# Patient Record
Sex: MALE | Race: WHITE | NOT HISPANIC OR LATINO | Employment: OTHER | URBAN - METROPOLITAN AREA
[De-identification: names, ages, dates, MRNs, and addresses within clinical notes are randomized per-mention and may not be internally consistent; named-entity substitution may affect disease eponyms.]

---

## 2021-05-07 ENCOUNTER — TELEPHONE (OUTPATIENT)
Dept: CARDIOLOGY | Facility: CLINIC | Age: 86
End: 2021-05-07

## 2021-05-07 ENCOUNTER — HOSPITAL ENCOUNTER (OUTPATIENT)
Dept: CARDIOLOGY | Facility: HOSPITAL | Age: 86
Discharge: HOME | End: 2021-05-07
Admitting: INTERNAL MEDICINE

## 2021-05-07 ENCOUNTER — OFFICE VISIT (OUTPATIENT)
Dept: CARDIOLOGY | Facility: CLINIC | Age: 86
End: 2021-05-07

## 2021-05-07 VITALS
BODY MASS INDEX: 26.14 KG/M2 | TEMPERATURE: 96.5 F | OXYGEN SATURATION: 99 % | HEIGHT: 72 IN | WEIGHT: 193 LBS | DIASTOLIC BLOOD PRESSURE: 82 MMHG | HEART RATE: 65 BPM | SYSTOLIC BLOOD PRESSURE: 124 MMHG

## 2021-05-07 DIAGNOSIS — I48.21 PERMANENT ATRIAL FIBRILLATION (HCC): Primary | ICD-10-CM

## 2021-05-07 DIAGNOSIS — I10 ESSENTIAL HYPERTENSION: ICD-10-CM

## 2021-05-07 DIAGNOSIS — R01.1 HEART MURMUR: ICD-10-CM

## 2021-05-07 DIAGNOSIS — R06.02 SHORTNESS OF BREATH: ICD-10-CM

## 2021-05-07 DIAGNOSIS — I48.21 PERMANENT ATRIAL FIBRILLATION (HCC): ICD-10-CM

## 2021-05-07 PROCEDURE — 93306 TTE W/DOPPLER COMPLETE: CPT | Performed by: INTERNAL MEDICINE

## 2021-05-07 PROCEDURE — 99204 OFFICE O/P NEW MOD 45 MIN: CPT | Performed by: INTERNAL MEDICINE

## 2021-05-07 PROCEDURE — 93306 TTE W/DOPPLER COMPLETE: CPT

## 2021-05-07 PROCEDURE — 93000 ELECTROCARDIOGRAM COMPLETE: CPT | Performed by: INTERNAL MEDICINE

## 2021-05-07 RX ORDER — HYDRALAZINE HYDROCHLORIDE 25 MG/1
25 TABLET, FILM COATED ORAL 3 TIMES DAILY
COMMUNITY
Start: 2021-04-27

## 2021-05-07 RX ORDER — FUROSEMIDE 20 MG/1
20 TABLET ORAL DAILY
COMMUNITY
Start: 2021-03-08

## 2021-05-07 NOTE — PROGRESS NOTES
"      CARDIOLOGY    Carmen Pelayo MD    ENCOUNTER DATE:  05/07/2021    Mohan Levy / 89 y.o. / male        CHIEF COMPLAINT / REASON FOR OFFICE VISIT     Atrial Fibrillation (new patient)      HISTORY OF PRESENT ILLNESS       HPI    Mohan Levy is a 89 y.o. male     This is a gentleman who is new to see me today. Unfortunately I do not have any old records on him. He previously followed with Dr. Lucero at [facility] but wants to switch his care to me. He was recently in the hospital with pneumonia at [facility]. He apparently had pleural effusion and had quite a bit of fluid drained from one side of his lungs. The amount or size is not known. He apparently has permanent atrial fibrillation and has had a pacemaker since 2010 with a generator change in 10/2020. He denies any chest pain or palpitations. He has chronic lower extremity edema which does not seem to be worse. He is still short of breath and uses oxygen as needed but his family has a pulse ox and says he is never hypoxic that they have found.        REVIEW OF SYSTEMS     Review of Systems   Constitutional: Negative for chills, fever, weight gain and weight loss.   Cardiovascular: Positive for leg swelling.   Respiratory: Positive for snoring. Negative for cough and wheezing.    Hematologic/Lymphatic: Negative for bleeding problem. Does not bruise/bleed easily.   Skin: Negative for color change.   Musculoskeletal: Negative for falls, joint pain and myalgias.   Gastrointestinal: Negative for melena.   Genitourinary: Negative for hematuria.   Neurological: Negative for excessive daytime sleepiness.   Psychiatric/Behavioral: Negative for depression. The patient is not nervous/anxious.          VITAL SIGNS     Visit Vitals  /82 (BP Location: Left arm)   Pulse 65   Temp 96.5 °F (35.8 °C)   Ht 182.9 cm (72\")   Wt 87.5 kg (193 lb)   SpO2 99%   BMI 26.18 kg/m²         Wt Readings from Last 3 Encounters:   05/07/21 87.5 kg (193 lb)   "   12/14/16 83.8 kg (184 lb 11.2 oz)     Body mass index is 26.18 kg/m².      PHYSICAL EXAMINATION     Constitutional:       General: Not in acute distress.  Neck:      Vascular: No carotid bruit or JVD.   Pulmonary:      Effort: Pulmonary effort is normal.      Breath sounds: Normal breath sounds.   Cardiovascular:      Normal rate. Regular rhythm.      Murmurs: There is a systolic murmur at the URSB.   Psychiatric:         Mood and Affect: Mood and affect normal.           REVIEWED DATA       ECG 12 Lead    Date/Time: 5/7/2021 9:11 AM  Performed by: Carmen Pelayo MD  Authorized by: Carmen Pelayo MD   Comparison: compared with previous ECG from 12/14/2016  Rhythm: atrial fibrillation  BPM: 73  Pacing: ventricular pacing  Clinical impression: abnormal EKG                  Lab Results   Component Value Date    GLUCOSE 150 (H) 12/16/2016    BUN 27 (H) 12/16/2016    CREATININE 1.29 (H) 12/16/2016    EGFRIFNONA 52 (L) 12/16/2016    BCR 20.9 12/16/2016    K 3.6 12/16/2016    CO2 22.5 12/16/2016    CALCIUM 8.1 (L) 12/16/2016       ASSESSMENT & PLAN      Diagnosis Plan   1. Permanent atrial fibrillation (CMS/HCC)  Adult Transthoracic Echo Complete W/ Cont if Necessary Per Protocol   2. Essential hypertension  Adult Transthoracic Echo Complete W/ Cont if Necessary Per Protocol   3. Heart murmur  Adult Transthoracic Echo Complete W/ Cont if Necessary Per Protocol   4. Shortness of breath  Adult Transthoracic Echo Complete W/ Cont if Necessary Per Protocol       1.  Permanent atrial fibrillation.  Not on anticoagulation.  2.  Pacemaker with ventricular pacing.  New generator October 2020.  We will get his pacemaker interrogated today and switched over to our system.  3.  Hypertension.  On hydralazine and amlodipine.  Blood pressure is currently well controlled.  4.  Hyperlipidemia.  On gemfibrozil.  Lipids followed by Dr. Rowley.  I do not have any recent lipid levels.  4.  Chronic kidney disease  5.  Obstructive sleep  apnea.  Compliant with CPAP.  6.  Murmur.  I do not have any old studies to review.  I am going to get an echocardiogram on him today to try to get a baseline while I attempt to get records from Dr. Lucero and [facility].    Addendum: His pacemaker check was normal.  His echocardiogram showed in addition to his diagnoses above:  7.  Moderate, severe aortic stenosis due to calcification of the valve.  8.  Mild tricuspid regurgitation with severe pulmonary hypertension.  9.  Mild mitral regurgitation.  10.  Normal LV systolic function with indeterminate diastolic function.    Follow-up with Trang in 3 months.      Orders Placed This Encounter   Procedures   • Adult Transthoracic Echo Complete W/ Cont if Necessary Per Protocol     Standing Status:   Future     Standing Expiration Date:   5/7/2022     Order Specific Question:   Reason for exam?     Answer:   Dyspnea           MEDICATIONS         Discharge Medications          Accurate as of May 7, 2021  9:09 AM. If you have any questions, ask your nurse or doctor.            Changes to Medications      Instructions Start Date   metoprolol tartrate 25 MG tablet  Commonly known as: LOPRESSOR  What changed: how much to take   12.5 mg, Oral, Every 12 Hours Scheduled         Continue These Medications      Instructions Start Date   amLODIPine 5 MG tablet  Commonly known as: NORVASC   5 mg, Oral, Daily      finasteride 5 MG tablet  Commonly known as: Proscar   5 mg, Oral, Daily      furosemide 20 MG tablet  Commonly known as: LASIX   20 mg, Oral, Daily      gemfibrozil 600 MG tablet  Commonly known as: LOPID   600 mg, Oral, 2 Times Daily Before Meals      hydrALAZINE 25 MG tablet  Commonly known as: APRESOLINE   25 mg, Oral, 3 Times Daily      raNITIdine 150 MG tablet  Commonly known as: ZANTAC   150 mg, Oral, 2 Times Daily         Stop These Medications    valsartan 160 MG tablet  Commonly known as: DIOVAN  Stopped by: Carmen Pelayo MD                       Carmen Pelayo MD  05/07/21  09:09 EDT    **Shari Disclaimer:   Much of this encounter note is an electronic transcription/translation of spoken language to printed text. The electronic translation of spoken language may permit erroneous, or at times, nonsensical words or phrases to be inadvertently transcribed. Although I have reviewed the note for such errors, some may still exist.

## 2021-05-07 NOTE — TELEPHONE ENCOUNTER
Can you please try and get some records from [facility] or [facility] on this patient. He saw Dr Lucero over on [address]. Dr Pelayo would also like to get records from [facility] as well I am working on getting a release signed.

## 2021-05-10 LAB
AORTIC ARCH: 2.6 CM
AORTIC DIMENSIONLESS INDEX: 0.2 (DI)
ASCENDING AORTA: 3.6 CM
BH CV ECHO MEAS - ACS: 1.3 CM
BH CV ECHO MEAS - AI MAX PG: 64.2 MMHG
BH CV ECHO MEAS - AI MAX VEL: 400.6 CM/SEC
BH CV ECHO MEAS - AO ARCH DIAM (PROXIMAL TRANS.): 2.6 CM
BH CV ECHO MEAS - AO MAX PG (FULL): 59.5 MMHG
BH CV ECHO MEAS - AO MAX PG: 62.3 MMHG
BH CV ECHO MEAS - AO MEAN PG (FULL): 29.1 MMHG
BH CV ECHO MEAS - AO MEAN PG: 30.4 MMHG
BH CV ECHO MEAS - AO ROOT AREA (BSA CORRECTED): 2.1
BH CV ECHO MEAS - AO ROOT AREA: 16 CM^2
BH CV ECHO MEAS - AO ROOT DIAM: 4.5 CM
BH CV ECHO MEAS - AO V2 MAX: 394.5 CM/SEC
BH CV ECHO MEAS - AO V2 MEAN: 252.1 CM/SEC
BH CV ECHO MEAS - AO V2 VTI: 85.2 CM
BH CV ECHO MEAS - ASC AORTA: 3.6 CM
BH CV ECHO MEAS - AVA(I,A): 0.69 CM^2
BH CV ECHO MEAS - AVA(I,D): 0.69 CM^2
BH CV ECHO MEAS - AVA(V,A): 0.67 CM^2
BH CV ECHO MEAS - AVA(V,D): 0.67 CM^2
BH CV ECHO MEAS - BSA(HAYCOCK): 2.1 M^2
BH CV ECHO MEAS - BSA: 2.1 M^2
BH CV ECHO MEAS - BZI_BMI: 26.4 KILOGRAMS/M^2
BH CV ECHO MEAS - BZI_METRIC_HEIGHT: 182.9 CM
BH CV ECHO MEAS - BZI_METRIC_WEIGHT: 88.5 KG
BH CV ECHO MEAS - EDV(TEICH): 104.7 ML
BH CV ECHO MEAS - EF(CUBED): 58.7 %
BH CV ECHO MEAS - EF(MOD-BP): 62 %
BH CV ECHO MEAS - EF(TEICH): 50.3 %
BH CV ECHO MEAS - ESV(TEICH): 52.1 ML
BH CV ECHO MEAS - FS: 25.5 %
BH CV ECHO MEAS - IVS/LVPW: 0.92
BH CV ECHO MEAS - IVSD: 1.5 CM
BH CV ECHO MEAS - LAT PEAK E' VEL: 9.6 CM/SEC
BH CV ECHO MEAS - LV MASS(C)D: 308.8 GRAMS
BH CV ECHO MEAS - LV MASS(C)DI: 146.5 GRAMS/M^2
BH CV ECHO MEAS - LV MAX PG: 2.8 MMHG
BH CV ECHO MEAS - LV MEAN PG: 1.2 MMHG
BH CV ECHO MEAS - LV V1 MAX: 83.1 CM/SEC
BH CV ECHO MEAS - LV V1 MEAN: 50.1 CM/SEC
BH CV ECHO MEAS - LV V1 VTI: 18.5 CM
BH CV ECHO MEAS - LVIDD: 4.7 CM
BH CV ECHO MEAS - LVIDS: 3.5 CM
BH CV ECHO MEAS - LVOT AREA (M): 3.1 CM^2
BH CV ECHO MEAS - LVOT AREA: 3.2 CM^2
BH CV ECHO MEAS - LVOT DIAM: 2 CM
BH CV ECHO MEAS - LVPWD: 1.6 CM
BH CV ECHO MEAS - MED PEAK E' VEL: 6.5 CM/SEC
BH CV ECHO MEAS - MR MAX PG: 73.5 MMHG
BH CV ECHO MEAS - MR MAX VEL: 428.6 CM/SEC
BH CV ECHO MEAS - MV A DUR: 0.17 SEC
BH CV ECHO MEAS - MV A MAX VEL: 49.7 CM/SEC
BH CV ECHO MEAS - MV DEC SLOPE: 732.8 CM/SEC^2
BH CV ECHO MEAS - MV DEC TIME: 0.09 SEC
BH CV ECHO MEAS - MV E MAX VEL: 111 CM/SEC
BH CV ECHO MEAS - MV E/A: 2.2
BH CV ECHO MEAS - MV MAX PG: 6.1 MMHG
BH CV ECHO MEAS - MV MEAN PG: 2.1 MMHG
BH CV ECHO MEAS - MV P1/2T MAX VEL: 128.8 CM/SEC
BH CV ECHO MEAS - MV P1/2T: 51.5 MSEC
BH CV ECHO MEAS - MV V2 MAX: 123.3 CM/SEC
BH CV ECHO MEAS - MV V2 MEAN: 66 CM/SEC
BH CV ECHO MEAS - MV V2 VTI: 26.5 CM
BH CV ECHO MEAS - MVA P1/2T LCG: 1.7 CM^2
BH CV ECHO MEAS - MVA(P1/2T): 4.3 CM^2
BH CV ECHO MEAS - MVA(VTI): 2.2 CM^2
BH CV ECHO MEAS - PA ACC TIME: 0.09 SEC
BH CV ECHO MEAS - PA MAX PG (FULL): 0.92 MMHG
BH CV ECHO MEAS - PA MAX PG: 2.2 MMHG
BH CV ECHO MEAS - PA PR(ACCEL): 38.6 MMHG
BH CV ECHO MEAS - PA V2 MAX: 73.8 CM/SEC
BH CV ECHO MEAS - PULM DIAS VEL: 42.4 CM/SEC
BH CV ECHO MEAS - PULM S/D: 0.53
BH CV ECHO MEAS - PULM SYS VEL: 22.3 CM/SEC
BH CV ECHO MEAS - PVA(V,A): 2.8 CM^2
BH CV ECHO MEAS - PVA(V,D): 2.8 CM^2
BH CV ECHO MEAS - QP/QS: 0.64
BH CV ECHO MEAS - RAP SYSTOLE: 3 MMHG
BH CV ECHO MEAS - RV MAX PG: 1.3 MMHG
BH CV ECHO MEAS - RV MEAN PG: 0.62 MMHG
BH CV ECHO MEAS - RV V1 MAX: 56.1 CM/SEC
BH CV ECHO MEAS - RV V1 MEAN: 37.1 CM/SEC
BH CV ECHO MEAS - RV V1 VTI: 10.1 CM
BH CV ECHO MEAS - RVOT AREA: 3.7 CM^2
BH CV ECHO MEAS - RVOT DIAM: 2.2 CM
BH CV ECHO MEAS - RVSP: 57 MMHG
BH CV ECHO MEAS - SI(AO): 646.1 ML/M^2
BH CV ECHO MEAS - SI(CUBED): 29.8 ML/M^2
BH CV ECHO MEAS - SI(LVOT): 27.8 ML/M^2
BH CV ECHO MEAS - SI(TEICH): 25 ML/M^2
BH CV ECHO MEAS - SUP REN AO DIAM: 2.5 CM
BH CV ECHO MEAS - SV(AO): 1362 ML
BH CV ECHO MEAS - SV(CUBED): 62.8 ML
BH CV ECHO MEAS - SV(LVOT): 58.7 ML
BH CV ECHO MEAS - SV(RVOT): 37.8 ML
BH CV ECHO MEAS - SV(TEICH): 52.7 ML
BH CV ECHO MEAS - TAPSE (>1.6): 1.8 CM
BH CV ECHO MEAS - TR MAX VEL: 368 CM/SEC
BH CV ECHO MEASUREMENTS AVERAGE E/E' RATIO: 13.79
BH CV XLRA - RV BASE: 3.5 CM
BH CV XLRA - RV LENGTH: 6.9 CM
BH CV XLRA - RV MID: 2.7 CM
BH CV XLRA - TDI S': 10.3 CM/SEC
LEFT ATRIUM VOLUME INDEX: 42 ML/M2
MAXIMAL PREDICTED HEART RATE: 125 BPM
SINUS: 4.5 CM
STJ: 2.9 CM
STRESS TARGET HR: 106 BPM